# Patient Record
Sex: FEMALE | Race: BLACK OR AFRICAN AMERICAN | NOT HISPANIC OR LATINO | URBAN - NONMETROPOLITAN AREA
[De-identification: names, ages, dates, MRNs, and addresses within clinical notes are randomized per-mention and may not be internally consistent; named-entity substitution may affect disease eponyms.]

---

## 2017-11-29 NOTE — PROCEDURE NOTE: SURGICAL
Prior to commencing surgery patient identification, surgical procedure, site, and side were confirmed by Dr. Kimberly Albert. Following topical proparacaine anesthesia, the patient was positioned at the YAG laser, a contact lens coupled to the cornea with methylcellulose and an axial posterior capsulotomy performed without complication using 3.1 Mj x 51. Excess methylcellulose was washed from the eye, one drop of Alphagan was instilled and the patient returned to the holding area having tolerated the procedure well and without complication. <br />

## 2018-05-18 PROBLEM — H16.223: Noted: 2018-05-18

## 2018-05-18 PROBLEM — H40.013: Noted: 2018-05-18

## 2018-05-18 PROBLEM — H52.4: Noted: 2018-05-18

## 2020-03-10 NOTE — PATIENT DISCUSSION
Discussed in detail re: nature of condition, dry vs wet AMD, AREDS.  Slight Increased PED, No Fluid, No Evidence of WET AMD.

## 2020-07-29 ENCOUNTER — IMPORTED ENCOUNTER (OUTPATIENT)
Dept: URBAN - NONMETROPOLITAN AREA CLINIC 1 | Facility: CLINIC | Age: 50
End: 2020-07-29

## 2020-07-29 PROCEDURE — 92014 COMPRE OPH EXAM EST PT 1/>: CPT

## 2020-07-29 PROCEDURE — 92015 DETERMINE REFRACTIVE STATE: CPT

## 2021-04-09 NOTE — PATIENT DISCUSSION
No fluid, Discussed AREDS and recommended AREDS2 vitamins; recommend daily Amsler grid use (copy of grid given); discussed no smoking or second-hand smoke.

## 2022-02-10 NOTE — PATIENT DISCUSSION
Hyperopia / Astigmatism / Presbyopia OU- Discussed diagnosis in detail with patient - New glasses Rx given today- Continue to monitor- RTC 1 year complete [place] OU- Discussed diagnosis in detail with patient- Discussed signs and symptoms of progression- Discussed UV protection- No treatment needed at this time - Continue to monitorBorderline Glaucoma OU- Discussed diagnosis in detail with patient- Family history in mother and father grandparents- IOP today OD 18 and OS 19- Cup to Disc noted at .6 OU- Continue to monitor- RTC 3-4 months F/U with OCT PACHY and gonio; 's Notes: MR 7/29/20DFE 7/29/20
English

## 2022-04-10 ASSESSMENT — VISUAL ACUITY
OS_CC: 20/25
OD_CC: 20/25+

## 2022-04-10 ASSESSMENT — TONOMETRY
OS_IOP_MMHG: 19
OD_IOP_MMHG: 18

## 2022-11-07 NOTE — PATIENT DISCUSSION
Advised regular use of Amsler grid.
Advised to call immediately if any worsening distortion or blurring is noted.
Call immediately to report any decreased vision or visual distortion.
Discussed in detail re: nature of condition, dry vs wet AMD, AREDS.  Slight Increased PED, No Fluid, No Evidence of WET AMD.
Monitor.
No retinal detachment or retinal tear noted.
Recommended observation.
Retinal detachment warnings given.
Retinal tear and detachment warning symptoms reviewed and patient instructed to call immediately if increasing floaters, flashes, or decreasing peripheral vision.
See DRY AMD for Imp/Plan.
Signs and symptoms of RD detachment reviewed.
1 pair